# Patient Record
Sex: FEMALE | Race: WHITE | NOT HISPANIC OR LATINO | Employment: OTHER | URBAN - METROPOLITAN AREA
[De-identification: names, ages, dates, MRNs, and addresses within clinical notes are randomized per-mention and may not be internally consistent; named-entity substitution may affect disease eponyms.]

---

## 2023-08-28 PROBLEM — I10 ESSENTIAL (PRIMARY) HYPERTENSION: Status: ACTIVE | Noted: 2023-08-28

## 2023-08-28 PROBLEM — J34.89 NASAL OBSTRUCTION: Status: ACTIVE | Noted: 2023-08-28

## 2023-08-28 PROBLEM — J32.0 CHRONIC LEFT MAXILLARY SINUSITIS: Status: ACTIVE | Noted: 2023-08-28

## 2023-08-28 PROBLEM — R68.89 FULLNESS IN HEAD: Status: ACTIVE | Noted: 2023-08-28

## 2023-08-28 PROBLEM — G25.0 BENIGN ESSENTIAL TREMOR: Status: ACTIVE | Noted: 2023-08-28

## 2023-08-28 PROBLEM — I35.0 NONRHEUMATIC AORTIC (VALVE) STENOSIS: Status: ACTIVE | Noted: 2023-08-28

## 2023-08-28 PROBLEM — H69.93 EUSTACHIAN TUBE DYSFUNCTION, BILATERAL: Status: ACTIVE | Noted: 2023-08-28

## 2023-08-28 PROBLEM — R44.8 FACIAL PRESSURE: Status: ACTIVE | Noted: 2023-08-28

## 2023-08-28 PROBLEM — R42 VERTIGO: Status: ACTIVE | Noted: 2023-08-28

## 2023-08-28 PROBLEM — H81.09 MENIERE'S DISEASE: Status: ACTIVE | Noted: 2023-08-28

## 2023-08-28 PROBLEM — J32.4 CHRONIC PANSINUSITIS: Status: ACTIVE | Noted: 2023-08-28

## 2023-08-28 PROBLEM — G43.909 MIGRAINE HEADACHE: Status: ACTIVE | Noted: 2023-08-28

## 2023-08-28 PROBLEM — H93.13 TINNITUS OF BOTH EARS: Status: ACTIVE | Noted: 2023-08-28

## 2023-08-28 PROBLEM — Q23.0 CONGENITAL AORTIC STENOSIS (HHS-HCC): Status: ACTIVE | Noted: 2023-08-28

## 2023-08-28 PROBLEM — H81.09: Status: ACTIVE | Noted: 2023-08-28

## 2023-08-28 PROBLEM — L08.9 INFECTED SKIN LESION: Status: ACTIVE | Noted: 2023-08-28

## 2023-08-28 PROBLEM — H90.3 SENSORINEURAL HEARING LOSS (SNHL) OF BOTH EARS: Status: ACTIVE | Noted: 2023-08-28

## 2023-08-28 PROBLEM — G43.809 VESTIBULAR MIGRAINE: Status: ACTIVE | Noted: 2023-08-28

## 2023-08-28 PROBLEM — J32.8 OTHER CHRONIC SINUSITIS: Status: ACTIVE | Noted: 2023-08-28

## 2023-08-28 PROBLEM — J32.2 CHRONIC ETHMOIDAL SINUSITIS: Status: ACTIVE | Noted: 2023-08-28

## 2023-08-28 PROBLEM — R20.8 DYSESTHESIA: Status: ACTIVE | Noted: 2023-08-28

## 2023-08-28 RX ORDER — ASPIRIN 81 MG/1
1 TABLET ORAL DAILY
COMMUNITY
Start: 2022-02-10

## 2023-08-28 RX ORDER — ACETAMINOPHEN 500 MG
TABLET ORAL
COMMUNITY

## 2023-08-28 RX ORDER — BACLOFEN 20 MG
1 TABLET ORAL DAILY
COMMUNITY

## 2023-08-28 RX ORDER — LISINOPRIL 10 MG/1
1 TABLET ORAL DAILY
COMMUNITY
Start: 2016-03-22 | End: 2023-11-17 | Stop reason: SDUPTHER

## 2023-08-28 RX ORDER — TORSEMIDE 10 MG/1
1 TABLET ORAL DAILY
COMMUNITY
Start: 2016-03-23 | End: 2023-11-09 | Stop reason: SDUPTHER

## 2023-08-28 RX ORDER — TRIENTINE HYDROCHLORIDE 250 MG/1
CAPSULE ORAL
COMMUNITY
Start: 2022-07-14

## 2023-08-28 RX ORDER — FLUTICASONE PROPIONATE 50 MCG
1 SPRAY, SUSPENSION (ML) NASAL DAILY
COMMUNITY
Start: 2020-01-30

## 2023-08-28 RX ORDER — ASCORBIC ACID 125 MG
TABLET,CHEWABLE ORAL
COMMUNITY

## 2023-08-28 RX ORDER — PANTOPRAZOLE SODIUM 20 MG/1
TABLET, DELAYED RELEASE ORAL
COMMUNITY
Start: 2020-12-18

## 2023-08-28 RX ORDER — ASPIRIN 81 MG
TABLET, DELAYED RELEASE (ENTERIC COATED) ORAL
COMMUNITY

## 2023-08-28 RX ORDER — SERTRALINE HYDROCHLORIDE 25 MG/1
TABLET, FILM COATED ORAL
COMMUNITY
Start: 2023-03-15

## 2023-08-28 RX ORDER — ATORVASTATIN CALCIUM 10 MG/1
1 TABLET, FILM COATED ORAL DAILY
COMMUNITY
Start: 2022-03-04 | End: 2024-04-18 | Stop reason: SDUPTHER

## 2023-08-28 RX ORDER — PHYTONADIONE (VIT K1) 100 %
LIQUID (ML) MISCELLANEOUS
COMMUNITY

## 2023-08-28 RX ORDER — DIMENHYDRINATE 50 MG
1 TABLET ORAL EVERY 6 HOURS PRN
COMMUNITY

## 2023-08-28 RX ORDER — CALCIUM CARBONATE 300MG(750)
TABLET,CHEWABLE ORAL
COMMUNITY
Start: 2017-01-31

## 2023-08-28 RX ORDER — PANCRELIPASE 36000; 180000; 114000 [USP'U]/1; [USP'U]/1; [USP'U]/1
CAPSULE, DELAYED RELEASE PELLETS ORAL
COMMUNITY

## 2023-08-28 RX ORDER — EPINEPHRINE 0.22MG
AEROSOL WITH ADAPTER (ML) INHALATION
COMMUNITY
Start: 2017-01-31

## 2023-08-28 RX ORDER — WITCH HAZEL 50 %
1 PADS, MEDICATED (EA) TOPICAL
COMMUNITY

## 2023-08-28 RX ORDER — PSEUDOEPHEDRINE HYDROCHLORIDE 240 MG/1
TABLET, FILM COATED, EXTENDED RELEASE ORAL
COMMUNITY
Start: 2017-01-31 | End: 2024-04-18 | Stop reason: SDUPTHER

## 2023-08-28 RX ORDER — UBIQUINOL 100 %
POWDER (GRAM) MISCELLANEOUS
COMMUNITY

## 2023-08-28 RX ORDER — TRIAMCINOLONE ACETONIDE 55 UG/1
SPRAY, METERED NASAL
COMMUNITY
Start: 2019-02-27

## 2023-08-28 RX ORDER — LEVOTHYROXINE, LIOTHYRONINE 38; 9 UG/1; UG/1
TABLET ORAL
COMMUNITY
Start: 2016-03-03

## 2023-08-28 RX ORDER — PERPHENAZINE 16 MG
TABLET ORAL 2 TIMES DAILY
COMMUNITY
Start: 2017-01-31

## 2023-08-28 RX ORDER — NIACIN (INOSITOL NIACINATE) 400(500MG)
CAPSULE ORAL
COMMUNITY
Start: 2017-01-31

## 2023-10-02 ENCOUNTER — TELEPHONE (OUTPATIENT)
Dept: PRIMARY CARE | Facility: CLINIC | Age: 58
End: 2023-10-02
Payer: MEDICARE

## 2023-10-02 DIAGNOSIS — J32.9 SINUSITIS, UNSPECIFIED CHRONICITY, UNSPECIFIED LOCATION: Primary | ICD-10-CM

## 2023-10-02 RX ORDER — AMOXICILLIN AND CLAVULANATE POTASSIUM 875; 125 MG/1; MG/1
875 TABLET, FILM COATED ORAL 2 TIMES DAILY
Qty: 20 TABLET | Refills: 0 | Status: SHIPPED | OUTPATIENT
Start: 2023-10-02 | End: 2023-10-12

## 2023-10-05 ENCOUNTER — OFFICE VISIT (OUTPATIENT)
Dept: CARDIOLOGY | Facility: CLINIC | Age: 58
End: 2023-10-05
Payer: MEDICARE

## 2023-10-05 ENCOUNTER — HOSPITAL ENCOUNTER (OUTPATIENT)
Dept: CARDIOLOGY | Facility: CLINIC | Age: 58
Discharge: HOME | End: 2023-10-05
Payer: MEDICARE

## 2023-10-05 VITALS
HEART RATE: 101 BPM | SYSTOLIC BLOOD PRESSURE: 146 MMHG | WEIGHT: 108.2 LBS | BODY MASS INDEX: 21.81 KG/M2 | HEIGHT: 59 IN | DIASTOLIC BLOOD PRESSURE: 80 MMHG

## 2023-10-05 DIAGNOSIS — Q23.1 CONGENITAL INSUFFICIENCY OF AORTIC VALVE (HHS-HCC): ICD-10-CM

## 2023-10-05 DIAGNOSIS — I10 ESSENTIAL (PRIMARY) HYPERTENSION: ICD-10-CM

## 2023-10-05 DIAGNOSIS — I35.0 AORTIC VALVE STENOSIS, ETIOLOGY OF CARDIAC VALVE DISEASE UNSPECIFIED: ICD-10-CM

## 2023-10-05 DIAGNOSIS — E78.2 MIXED HYPERLIPIDEMIA: ICD-10-CM

## 2023-10-05 DIAGNOSIS — I35.0 NONRHEUMATIC AORTIC (VALVE) STENOSIS: ICD-10-CM

## 2023-10-05 DIAGNOSIS — Q23.0 CONGENITAL AORTIC STENOSIS (HHS-HCC): Primary | ICD-10-CM

## 2023-10-05 PROCEDURE — 99214 OFFICE O/P EST MOD 30 MIN: CPT | Performed by: INTERNAL MEDICINE

## 2023-10-05 PROCEDURE — 3077F SYST BP >= 140 MM HG: CPT | Performed by: INTERNAL MEDICINE

## 2023-10-05 PROCEDURE — 93306 TTE W/DOPPLER COMPLETE: CPT | Performed by: INTERNAL MEDICINE

## 2023-10-05 PROCEDURE — 93306 TTE W/DOPPLER COMPLETE: CPT

## 2023-10-05 PROCEDURE — 3079F DIAST BP 80-89 MM HG: CPT | Performed by: INTERNAL MEDICINE

## 2023-11-09 DIAGNOSIS — I10 ESSENTIAL (PRIMARY) HYPERTENSION: Primary | ICD-10-CM

## 2023-11-09 DIAGNOSIS — G43.809 VESTIBULAR MIGRAINE: ICD-10-CM

## 2023-11-09 RX ORDER — TORSEMIDE 10 MG/1
10 TABLET ORAL DAILY
Qty: 30 TABLET | Refills: 0 | Status: SHIPPED | OUTPATIENT
Start: 2023-11-09 | End: 2023-11-17 | Stop reason: SDUPTHER

## 2023-11-13 LAB
BODY SURFACE AREA: 1.43 M2
EJECTION FRACTION APICAL 4 CHAMBER: 74.8

## 2023-11-16 DIAGNOSIS — G43.809 VESTIBULAR MIGRAINE: ICD-10-CM

## 2023-11-16 DIAGNOSIS — I10 ESSENTIAL (PRIMARY) HYPERTENSION: Primary | ICD-10-CM

## 2023-11-16 RX ORDER — LISINOPRIL 10 MG/1
10 TABLET ORAL DAILY
Status: CANCELLED | OUTPATIENT
Start: 2023-11-16

## 2023-11-16 NOTE — TELEPHONE ENCOUNTER
Patient is calling in wanting to clarify that her     Torsemide 10 mg is taken twice daily not one time daily so the next one to be called in needs to be changed to twice daily.  Patient states that she is due for this refill on Monday if it can be sent it and she will  when due.     Patient is needing Lisinopril 10 mg     Wants them sent to [place] in chart.

## 2023-11-17 RX ORDER — LISINOPRIL 10 MG/1
10 TABLET ORAL DAILY
Qty: 30 TABLET | Refills: 11 | Status: SHIPPED | OUTPATIENT
Start: 2023-11-17 | End: 2024-03-25 | Stop reason: SDUPTHER

## 2023-11-17 RX ORDER — TORSEMIDE 10 MG/1
10 TABLET ORAL 2 TIMES DAILY
Qty: 60 TABLET | Refills: 0 | Status: SHIPPED | OUTPATIENT
Start: 2023-11-17 | End: 2023-12-19

## 2023-12-22 DIAGNOSIS — G25.0 BENIGN ESSENTIAL TREMOR: ICD-10-CM

## 2023-12-22 DIAGNOSIS — G43.809 VESTIBULAR MIGRAINE: ICD-10-CM

## 2023-12-22 DIAGNOSIS — Z00.00 ROUTINE GENERAL MEDICAL EXAMINATION AT A HEALTH CARE FACILITY: ICD-10-CM

## 2023-12-22 DIAGNOSIS — I10 ESSENTIAL (PRIMARY) HYPERTENSION: Primary | ICD-10-CM

## 2023-12-22 RX ORDER — TORSEMIDE 10 MG/1
TABLET ORAL
Qty: 60 TABLET | Refills: 0 | Status: SHIPPED | OUTPATIENT
Start: 2023-12-22 | End: 2024-02-16

## 2023-12-23 NOTE — PROGRESS NOTES
Subjective   Patient ID: Rosita Darby is a 58 y.o. female who presents for No chief complaint on file..  HPI    Review of Systems    Objective   Physical Exam    Assessment/Plan              .VS

## 2024-02-16 DIAGNOSIS — G43.809 VESTIBULAR MIGRAINE: ICD-10-CM

## 2024-02-16 DIAGNOSIS — I10 ESSENTIAL (PRIMARY) HYPERTENSION: ICD-10-CM

## 2024-02-16 RX ORDER — TORSEMIDE 10 MG/1
TABLET ORAL
Qty: 60 TABLET | Refills: 0 | Status: SHIPPED | OUTPATIENT
Start: 2024-02-16 | End: 2024-03-14

## 2024-03-14 DIAGNOSIS — G43.809 VESTIBULAR MIGRAINE: ICD-10-CM

## 2024-03-14 DIAGNOSIS — I10 ESSENTIAL (PRIMARY) HYPERTENSION: ICD-10-CM

## 2024-03-14 RX ORDER — TORSEMIDE 10 MG/1
TABLET ORAL
Qty: 60 TABLET | Refills: 0 | Status: SHIPPED | OUTPATIENT
Start: 2024-03-14 | End: 2024-03-25

## 2024-03-24 DIAGNOSIS — I10 ESSENTIAL (PRIMARY) HYPERTENSION: ICD-10-CM

## 2024-03-24 DIAGNOSIS — G43.809 VESTIBULAR MIGRAINE: ICD-10-CM

## 2024-03-25 DIAGNOSIS — I10 ESSENTIAL (PRIMARY) HYPERTENSION: ICD-10-CM

## 2024-03-25 RX ORDER — TORSEMIDE 10 MG/1
TABLET ORAL
Qty: 60 TABLET | Refills: 0 | Status: SHIPPED | OUTPATIENT
Start: 2024-03-25 | End: 2024-04-18 | Stop reason: SDUPTHER

## 2024-03-25 RX ORDER — LISINOPRIL 10 MG/1
10 TABLET ORAL DAILY
Qty: 30 TABLET | Refills: 11 | Status: SHIPPED | OUTPATIENT
Start: 2024-03-25 | End: 2024-04-18 | Stop reason: SDUPTHER

## 2024-04-18 ENCOUNTER — OFFICE VISIT (OUTPATIENT)
Dept: PRIMARY CARE | Facility: CLINIC | Age: 59
End: 2024-04-18
Payer: MEDICARE

## 2024-04-18 VITALS
HEART RATE: 100 BPM | HEIGHT: 58 IN | DIASTOLIC BLOOD PRESSURE: 70 MMHG | TEMPERATURE: 98.7 F | WEIGHT: 115 LBS | SYSTOLIC BLOOD PRESSURE: 102 MMHG | BODY MASS INDEX: 24.14 KG/M2

## 2024-04-18 DIAGNOSIS — Z78.9 FULL CODE STATUS: ICD-10-CM

## 2024-04-18 DIAGNOSIS — Z12.11 COLON CANCER SCREENING: ICD-10-CM

## 2024-04-18 DIAGNOSIS — E03.9 HYPOTHYROIDISM, UNSPECIFIED TYPE: ICD-10-CM

## 2024-04-18 DIAGNOSIS — Z00.01 ENCOUNTER FOR GENERAL ADULT MEDICAL EXAMINATION WITH ABNORMAL FINDINGS: Primary | ICD-10-CM

## 2024-04-18 DIAGNOSIS — Z12.31 ENCOUNTER FOR SCREENING MAMMOGRAM FOR MALIGNANT NEOPLASM OF BREAST: ICD-10-CM

## 2024-04-18 DIAGNOSIS — E78.5 HYPERLIPIDEMIA, UNSPECIFIED HYPERLIPIDEMIA TYPE: ICD-10-CM

## 2024-04-18 DIAGNOSIS — Z11.4 ENCOUNTER FOR SCREENING FOR HIV: ICD-10-CM

## 2024-04-18 DIAGNOSIS — H81.09 MENIERE'S DISEASE, UNSPECIFIED LATERALITY: ICD-10-CM

## 2024-04-18 DIAGNOSIS — M81.0 AGE-RELATED OSTEOPOROSIS WITHOUT CURRENT PATHOLOGICAL FRACTURE: ICD-10-CM

## 2024-04-18 DIAGNOSIS — Z00.00 ANNUAL PHYSICAL EXAM: ICD-10-CM

## 2024-04-18 DIAGNOSIS — G43.809 VESTIBULAR MIGRAINE: ICD-10-CM

## 2024-04-18 DIAGNOSIS — Z11.59 ENCOUNTER FOR HEPATITIS C SCREENING TEST FOR LOW RISK PATIENT: ICD-10-CM

## 2024-04-18 DIAGNOSIS — I10 ESSENTIAL (PRIMARY) HYPERTENSION: ICD-10-CM

## 2024-04-18 PROCEDURE — G0439 PPPS, SUBSEQ VISIT: HCPCS | Performed by: FAMILY MEDICINE

## 2024-04-18 PROCEDURE — 99214 OFFICE O/P EST MOD 30 MIN: CPT | Performed by: FAMILY MEDICINE

## 2024-04-18 PROCEDURE — 1036F TOBACCO NON-USER: CPT | Performed by: FAMILY MEDICINE

## 2024-04-18 PROCEDURE — 3078F DIAST BP <80 MM HG: CPT | Performed by: FAMILY MEDICINE

## 2024-04-18 PROCEDURE — 3074F SYST BP LT 130 MM HG: CPT | Performed by: FAMILY MEDICINE

## 2024-04-18 PROCEDURE — G2211 COMPLEX E/M VISIT ADD ON: HCPCS | Performed by: FAMILY MEDICINE

## 2024-04-18 RX ORDER — LISINOPRIL 10 MG/1
10 TABLET ORAL DAILY
Qty: 90 TABLET | Refills: 3 | Status: SHIPPED | OUTPATIENT
Start: 2024-04-18 | End: 2025-04-18

## 2024-04-18 RX ORDER — PSEUDOEPHEDRINE HYDROCHLORIDE 240 MG/1
240 TABLET, FILM COATED, EXTENDED RELEASE ORAL DAILY
Qty: 14 TABLET | Refills: 3 | Status: SHIPPED | OUTPATIENT
Start: 2024-04-18 | End: 2024-05-01 | Stop reason: SDUPTHER

## 2024-04-18 RX ORDER — ATORVASTATIN CALCIUM 10 MG/1
10 TABLET, FILM COATED ORAL DAILY
Qty: 90 TABLET | Refills: 3 | Status: SHIPPED | OUTPATIENT
Start: 2024-04-18 | End: 2025-04-18

## 2024-04-18 RX ORDER — TORSEMIDE 10 MG/1
TABLET ORAL
Qty: 60 TABLET | Refills: 2 | Status: SHIPPED | OUTPATIENT
Start: 2024-04-18

## 2024-04-18 ASSESSMENT — ACTIVITIES OF DAILY LIVING (ADL)
DOING_HOUSEWORK: INDEPENDENT
GROCERY_SHOPPING: INDEPENDENT
MANAGING_FINANCES: INDEPENDENT
TAKING_MEDICATION: INDEPENDENT
BATHING: INDEPENDENT
DRESSING: INDEPENDENT

## 2024-04-18 ASSESSMENT — ENCOUNTER SYMPTOMS
LOSS OF SENSATION IN FEET: 0
DEPRESSION: 0
OCCASIONAL FEELINGS OF UNSTEADINESS: 0

## 2024-04-18 ASSESSMENT — PATIENT HEALTH QUESTIONNAIRE - PHQ9
1. LITTLE INTEREST OR PLEASURE IN DOING THINGS: NOT AT ALL
2. FEELING DOWN, DEPRESSED OR HOPELESS: NOT AT ALL
SUM OF ALL RESPONSES TO PHQ9 QUESTIONS 1 AND 2: 0

## 2024-04-18 NOTE — PATIENT INSTRUCTIONS
KNOWN TO DR HUSAIN FOR ENDOCRINE THYROID CARE   NOW REFER TO ENDOCRINOLOGY FOR OSTEOPOROSIS NEEDED.   KEEP CARE WITH DR BULL OF RHEUMATOLOGY FOR THINNING BONES.    USE [facility].   CALL FOR NEEDS [phone].    GET FASTED LABS COMPLETED.    SORRY WE CANNOT JUSTIFY TESTING YOUR BLOOD TYPE.    GET MAMMO DONE.   GET COLON CANCER SCREENING DONE.

## 2024-04-18 NOTE — PROGRESS NOTES
"Subjective   Patient ID: Rosita Darby is a 59 y.o. female who presents for Medicare Annual Wellness Visit Initial (MEDICARE WELLNESS EXAM ).  HPI  AWV  0820  08:45 = 25 MINUTES FOR 39881   09:02 = 17 MINUTES   DURATION:  42 MINUTES     PT ASKS FOR AN INSULIN LEVEL,  WHY?   DOSE LOSARTAN INCREASE BLOOD SUGAR?  MY BROTHER IS ON IT.   [LIKELY BROTHER IS ON SGLP2 FOR HEART OR KIDNEYS NOT DM2].      PT ASKS FOR HER HT. 4' 9.75\"   HT WAS TALLER AT RHEUMATOLOGY 4\"10\"    JUNE 2023 OSTEOPOROSIS ON DEXA.      ? ENDO FOR THYROID  ? ENDO FOR OSTEOPOROSIS NEEDED  RHEUM DR BULL     Review of Systems    Objective   Physical Exam    Assessment/Plan   Diagnoses and all orders for this visit:  Encounter for general adult medical examination with abnormal findings  Age-related osteoporosis without current pathological fracture  -     Referral to Endocrinology; Future  -     Lipid Panel; Future  -     Vitamin D 25-Hydroxy,Total (for eval of Vitamin D levels); Future  -     Urinalysis with Reflex Microscopic; Future  -     TSH with reflex to Free T4 if abnormal; Future  -     Comprehensive Metabolic Panel; Future  -     CBC and Auto Differential; Future  -     Homocysteine, serum; Future  -     Referral to Rheumatology; Future  Hypothyroidism, unspecified type  -     Referral to Endocrinology; Future  -     Lipid Panel; Future  -     Vitamin D 25-Hydroxy,Total (for eval of Vitamin D levels); Future  -     Urinalysis with Reflex Microscopic; Future  -     TSH with reflex to Free T4 if abnormal; Future  -     Comprehensive Metabolic Panel; Future  -     CBC and Auto Differential; Future  -     Homocysteine, serum; Future  Encounter for hepatitis C screening test for low risk patient  -     Hepatitis C antibody; Future  Encounter for screening for HIV  -     HIV 1/2 Antigen/Antibody Screen with Reflex to Confirmation; Future  Annual physical exam  -     Lipid Panel; Future  Essential (primary) hypertension  -     Lipid Panel; " Future  -     Vitamin D 25-Hydroxy,Total (for eval of Vitamin D levels); Future  -     Urinalysis with Reflex Microscopic; Future  -     TSH with reflex to Free T4 if abnormal; Future  -     Comprehensive Metabolic Panel; Future  -     CBC and Auto Differential; Future  -     Homocysteine, serum; Future  -     Sedimentation Rate; Future  Meniere's disease, unspecified laterality  -     Homocysteine, serum; Future  -     Sedimentation Rate; Future  Colon cancer screening  -     Cologuard® colon cancer screening; Future  Encounter for screening mammogram for malignant neoplasm of breast  -     BI mammo bilateral screening tomosynthesis; Future             FROM MEDENT:  Date: 23   HISTORY SUMMARY - [facility] INTERNAL MEDICINE SPEC       Name:  Rosita Darby                           Acct#: [id number]    Sex: F  : 1965  Age: 58 years            PMH:  Immun/Inj. Record:  22397-To Vaccine 7 Yrs &> Decavac  NDC [address]-88 42106-939-05 sanof   90659-Influenza Virus Whole   90657-Fluzone Split 6-35 Mon  NDC [address]-45   Problem List: Inactive Meniere's disease, Sensorineural hearing loss, bilateral, Dysfunction of eustachian tube, Nasal congestion, Migraine, Essential tremor, Meniere's disease, Vertigo, Bilateral tinnitus, Symptom of head, Skin lesion, Migraine variants, Chronic pansinusitis, Acute sinusitis, Crusting on nose, Congenital stenosis of aortic valve, Abnormal sensation, Nasal obstruction, Chronic sinusitis, Chronic ethmoidal sinusitis, Chronic maxillary sinusitis  Health Maintenance:  Bone Density Test - (2016) T-Score: -2.6/-2.7/-2.7 INCREASED RISK:  2.2. OSTEOPOROSIS   Mammogram - (10/19/2015) WNL  Ophtho Care - (2017)  echo - (10/3/2022) ECHO 10/'22: BICUSPID AND STENOTIC AORTIC VALVE; LVEF 65% ; MILD AS - NL 89gnt27:pgs  EGD - (2019) NL EGD:  5.16.19:pgs  DR PRIETO HAS BEEN NEG.  EGD HH 3 CM NOTED.    Stress ECG - (2019) NL STRESS ECG -  19:pgs  Carotid U/S - (2020) : DIFFUSE PLAQUE;  50-59% RT; NL LEFT - 2020:PGS    Mammogram - (2020) ABNL MAMMO LT UPPER OUTER QUADRANT: #2 ASSYM STRX: 2:30 & 3:30 O'CLOCK:  SURG DR ERICKSON   Flu - CANNOT TAKE ALLERGIC TO IT   Bone Density Test - (2021) osteoporosis:         Covid Vax - (2022) REFUSED   Carotid U/S - (5/3/2023) :     proximal rt common carotid artery stenosis - stable as are other findings:  ok.    Bone Density Test - (6/15/2023) : worse osteoporosis- refer to endo dr porras       osteoporosis - (2023) : RHEUM TO TEST PT/CONSIDER TX; EVENITY, TYMLOS/FORTEO:       MAMMOS Q YEARLY  Medical Problems:  Attention Deficit Hyperactivity Disorder, Seasonal Allergies, Arrhythmia, Gallstones, High Blood Pressure, Osteoporosis, Rheumatoid Arthritis, Thyroid Disease  Jra - KNEES--HX OF JRA DR BECK IN PAST  Surgical Hx:  total hysterectomy not due to malignancy - BLEEDING  Tonsillectomy, Adenoidectomy  Excision Of Breast Mass - (2020) LEFT BREAST MASS/CALCIFIED CORE BX FR ERICKSON 2020:PGS    FH:  HTN; TROUBLE SLEEPING; MGM AGE 53  OF STROKE; BROTHER HEART DZ; PGF LUNG CANCER. FHX: BRO ILL HTN UNCONTROLLED; DAD ? AFIB TIA; PGRMOM ILL TOO... B'DAY AUG 4789=660...  .  Father:Age: 77 as of 2017.  Mother: due to Heart Disease.    SH:  Marital: .Lives With: Son.Occupation: [employer] - (2021) RETIRED/DISABLED.  Personal Habits:  Cigarette Use: Never Smoked Cigarettes.Alcohol: Occasional Alcoholic Beverage.Daily Caffeine: Consumes on average 2 cups of tea per day, Consumes on average 2 cups of coffee per day.Enjoy Exercising: Enjoys exercising.

## 2024-04-19 ENCOUNTER — TELEPHONE (OUTPATIENT)
Dept: PRIMARY CARE | Facility: CLINIC | Age: 59
End: 2024-04-19
Payer: MEDICARE

## 2024-05-01 DIAGNOSIS — E78.5 HYPERLIPIDEMIA, UNSPECIFIED HYPERLIPIDEMIA TYPE: Primary | ICD-10-CM

## 2024-05-01 DIAGNOSIS — G43.809 VESTIBULAR MIGRAINE: ICD-10-CM

## 2024-05-01 DIAGNOSIS — I10 ESSENTIAL (PRIMARY) HYPERTENSION: ICD-10-CM

## 2024-05-01 DIAGNOSIS — H81.09 MENIERE'S DISEASE, UNSPECIFIED LATERALITY: ICD-10-CM

## 2024-05-01 RX ORDER — PSEUDOEPHEDRINE HYDROCHLORIDE 240 MG/1
240 TABLET, FILM COATED, EXTENDED RELEASE ORAL DAILY
Qty: 14 TABLET | Refills: 3 | Status: SHIPPED | OUTPATIENT
Start: 2024-05-01 | End: 2024-05-03 | Stop reason: SDUPTHER

## 2024-05-01 RX ORDER — PSEUDOEPHEDRINE HYDROCHLORIDE 240 MG/1
240 TABLET, FILM COATED, EXTENDED RELEASE ORAL DAILY
Qty: 30 TABLET | Refills: 11 | Status: SHIPPED | OUTPATIENT
Start: 2024-05-01 | End: 2024-05-03 | Stop reason: SDUPTHER

## 2024-05-01 NOTE — TELEPHONE ENCOUNTER
Patient called states that she gets the sudafed for 30 day at a time and needs to be sent to  in chart. Also is asking for her LPA ordered on blood work.

## 2024-05-03 ENCOUNTER — TELEPHONE (OUTPATIENT)
Dept: PRIMARY CARE | Facility: CLINIC | Age: 59
End: 2024-05-03
Payer: MEDICARE

## 2024-05-03 DIAGNOSIS — I10 ESSENTIAL (PRIMARY) HYPERTENSION: ICD-10-CM

## 2024-05-03 DIAGNOSIS — H81.09 MENIERE'S DISEASE, UNSPECIFIED LATERALITY: ICD-10-CM

## 2024-05-03 DIAGNOSIS — G43.809 VESTIBULAR MIGRAINE: ICD-10-CM

## 2024-05-03 RX ORDER — PSEUDOEPHEDRINE HYDROCHLORIDE 240 MG/1
240 TABLET, FILM COATED, EXTENDED RELEASE ORAL DAILY
Qty: 14 TABLET | Refills: 3 | Status: SHIPPED | OUTPATIENT
Start: 2024-05-03

## 2024-07-20 DIAGNOSIS — G43.809 VESTIBULAR MIGRAINE: ICD-10-CM

## 2024-07-20 DIAGNOSIS — I10 ESSENTIAL (PRIMARY) HYPERTENSION: ICD-10-CM

## 2024-07-22 RX ORDER — TORSEMIDE 10 MG/1
TABLET ORAL
Qty: 60 TABLET | Refills: 0 | Status: SHIPPED | OUTPATIENT
Start: 2024-07-22

## 2024-08-23 DIAGNOSIS — I10 ESSENTIAL (PRIMARY) HYPERTENSION: ICD-10-CM

## 2024-08-23 DIAGNOSIS — G43.809 VESTIBULAR MIGRAINE: ICD-10-CM

## 2024-08-26 RX ORDER — TORSEMIDE 10 MG/1
TABLET ORAL
Qty: 60 TABLET | Refills: 0 | Status: SHIPPED | OUTPATIENT
Start: 2024-08-26

## 2024-09-20 DIAGNOSIS — I10 ESSENTIAL (PRIMARY) HYPERTENSION: ICD-10-CM

## 2024-09-20 DIAGNOSIS — G43.809 VESTIBULAR MIGRAINE: ICD-10-CM

## 2024-09-20 RX ORDER — TORSEMIDE 10 MG/1
TABLET ORAL
Qty: 60 TABLET | Refills: 0 | Status: SHIPPED | OUTPATIENT
Start: 2024-09-20

## 2024-10-10 ENCOUNTER — APPOINTMENT (OUTPATIENT)
Dept: CARDIOLOGY | Facility: CLINIC | Age: 59
End: 2024-10-10
Payer: MEDICARE

## 2024-10-14 ENCOUNTER — OFFICE VISIT (OUTPATIENT)
Dept: CARDIOLOGY | Facility: CLINIC | Age: 59
End: 2024-10-14
Payer: MEDICARE

## 2024-10-14 ENCOUNTER — HOSPITAL ENCOUNTER (OUTPATIENT)
Dept: CARDIOLOGY | Facility: CLINIC | Age: 59
Discharge: HOME | End: 2024-10-14
Payer: MEDICARE

## 2024-10-14 VITALS
DIASTOLIC BLOOD PRESSURE: 83 MMHG | HEART RATE: 101 BPM | WEIGHT: 116.2 LBS | TEMPERATURE: 96.2 F | SYSTOLIC BLOOD PRESSURE: 168 MMHG | BODY MASS INDEX: 24.39 KG/M2 | HEIGHT: 58 IN

## 2024-10-14 DIAGNOSIS — I35.0 NONRHEUMATIC AORTIC (VALVE) STENOSIS: ICD-10-CM

## 2024-10-14 DIAGNOSIS — Q23.0 CONGENITAL AORTIC STENOSIS (HHS-HCC): ICD-10-CM

## 2024-10-14 DIAGNOSIS — E78.2 MIXED HYPERLIPIDEMIA: ICD-10-CM

## 2024-10-14 DIAGNOSIS — I10 ESSENTIAL (PRIMARY) HYPERTENSION: ICD-10-CM

## 2024-10-14 DIAGNOSIS — Q23.1 CONGENITAL INSUFFICIENCY OF AORTIC VALVE (HHS-HCC): ICD-10-CM

## 2024-10-14 LAB
AORTIC VALVE MEAN GRADIENT: 29.3 MMHG
AORTIC VALVE PEAK VELOCITY: 3.65 M/S
AV PEAK GRADIENT: 53.4 MMHG
AVA (PEAK VEL): 0.73 CM2
AVA (VTI): 0.82 CM2
EJECTION FRACTION APICAL 4 CHAMBER: 70.4
EJECTION FRACTION: 67 %
LEFT ATRIUM VOLUME AREA LENGTH INDEX BSA: 25.9 ML/M2
LEFT VENTRICLE INTERNAL DIMENSION DIASTOLE: 3.01 CM (ref 3.5–6)
LEFT VENTRICULAR OUTFLOW TRACT DIAMETER: 1.67 CM
MITRAL VALVE E/A RATIO: 0.69
RIGHT VENTRICLE FREE WALL PEAK S': 14 CM/S
RIGHT VENTRICLE PEAK SYSTOLIC PRESSURE: 26.5 MMHG
TRICUSPID ANNULAR PLANE SYSTOLIC EXCURSION: 2.2 CM

## 2024-10-14 PROCEDURE — 3077F SYST BP >= 140 MM HG: CPT | Performed by: INTERNAL MEDICINE

## 2024-10-14 PROCEDURE — 93306 TTE W/DOPPLER COMPLETE: CPT

## 2024-10-14 PROCEDURE — 93306 TTE W/DOPPLER COMPLETE: CPT | Performed by: INTERNAL MEDICINE

## 2024-10-14 PROCEDURE — 99214 OFFICE O/P EST MOD 30 MIN: CPT | Performed by: INTERNAL MEDICINE

## 2024-10-14 PROCEDURE — 3008F BODY MASS INDEX DOCD: CPT | Performed by: INTERNAL MEDICINE

## 2024-10-14 PROCEDURE — 1036F TOBACCO NON-USER: CPT | Performed by: INTERNAL MEDICINE

## 2024-10-14 PROCEDURE — 3079F DIAST BP 80-89 MM HG: CPT | Performed by: INTERNAL MEDICINE

## 2024-10-14 NOTE — PROGRESS NOTES
Chief Complaint:   Valve Disorder     History of Present Illness     Rosita Darby is a 59 y.o. female presenting with moderate aortic stenosis.  The patient has been well since their last appointment and has no cardiac complaints.  Exercising. The patient denies chest pain, shortness of breath, or syncope.      Review of Systems  All pertinent systems have been reviewed and are negative except for what is stated in the history of present illness.    All other systems have been reviewed and are negative and noncontributory to this patient's current ailments.  .       Previous History     Past Medical History:  She has a past medical history of Allergy to other foods, Aortic stenosis due to bicuspid aortic valve, Meniere's disease, unspecified ear, Other allergy status, other than to drugs and biological substances, Other conditions influencing health status, Personal history of other diseases of the circulatory system, Personal history of other diseases of the circulatory system, and Personal history of other diseases of the musculoskeletal system and connective tissue.    Past Surgical History:  She has a past surgical history that includes Tonsillectomy (01/31/2017); Hysterectomy (01/31/2017); Laparoscopy diagnostic / biopsy / aspiration / lysis (01/31/2017); Dilation and curettage of uterus (01/31/2017); and BI stereotactic guided breast left localization (Left, 11/24/2020).      Social History:  She reports that she has never smoked. She has never used smokeless tobacco. She reports that she does not currently use alcohol. She reports that she does not use drugs.    Family History:  Family History   Problem Relation Name Age of Onset    Rheumatic fever Mother      Other (heart trouble) Brother      Other (heart trouble) Maternal Grandmother      Other (heart trouble) Paternal Grandmother      Other (heart trouble) Paternal Grandfather          Allergies:  Egg, Influenza virus vaccine whole, Latex, Lidocaine hcl,  Lorazepam, Milk, Sulfa (sulfonamide antibiotics), and Tree nuts    Outpatient Medications:  Current Outpatient Medications   Medication Instructions    ACETYLCYSTEINE ORAL oral    adrenal cortex, porcine, (ADRENAL ORAL) oral, organ concentrates (ADRENAL ORAL)    alpha lipoic acid 600 mg capsule oral, 2 times daily    aspirin 81 mg EC tablet 1 tablet, oral, Daily    Bacillus subtilis-inulin 1.5 billion cell-1 gram tablet,chewable oral, Daily, 1    BERBERINE CHLORIDE ORAL oral    BORON ORAL oral    cholecalciferol (Vitamin D-3) 5,000 Units tablet oral, Once Weekly, Take 1     coenzyme Q-10 100 mg capsule oral    coenzyme Q10-vitamin E 100-5 mg-unit capsule     Creon 36,000-114,000- 180,000 unit capsule,delayed release(DR/EC) capsule oral, take 1 capsule by oral route 2 times every day with meals and 1 capsule with each snack swallowing whole. Do not crush, chew and/or divide.    cyanocobalamin (Vitamin B-12) 2,000 mcg tablet 1 tablet, oral    dimenhyDRINATE (Dramamine) 50 mg tablet 1 tablet, oral, Every 6 hours PRN    fluticasone (Flonase) 50 mcg/actuation nasal spray 1 spray, Each Nostril, Daily    magnesium oxide 500 mg tablet 1 tablet, oral, Daily    NP Thyroid 60 mg tablet oral, 2 times daily, Take 1     OREGANO OIL ORAL oral    pantoprazole (ProtoNix) 20 mg EC tablet oral    phytonadione, vit K1,, bulk, (Vitamin K1) 100 % liquid VITAMIN K (unknown strength)    SELENIUM ORAL oral, SELENIUM ORAL    sertraline (Zoloft) 25 mg tablet     Sudafed 24 Hour 240 mg, oral, Daily, SUDAFED IS THE ONLY DRUG PT HAS GO TO [facility].    torsemide (Demadex) 10 mg tablet TAKE ONE TABLET BY MOUTH TWICE A DAY. FORCE FLUIDS AND LIMIT SALT INTAKE AS DIRECTED    triamcinolone (Nasacort) 55 mcg nasal inhaler     trientine (Syprine) 250 mg capsule oral    ubiquinol, bulk, 100 % powder UBIQUINOL (unknown strength)    vitamin K2 100 mcg capsule oral, VITAMIN K2 (unknown strength)    ZINC ORAL oral, Daily, ZINC (unknown strength); take 1  "      Physical Examination   Vitals:  Visit Vitals  /83   Pulse 101   Temp 35.7 °C (96.2 °F)   Ht 1.473 m (4' 10\")   Wt 52.7 kg (116 lb 3.2 oz)   BMI 24.29 kg/m²   Smoking Status Never   BSA 1.47 m²    Physical Exam  Vitals reviewed.   Constitutional:       General: She is not in acute distress.     Appearance: Normal appearance.   HENT:      Head: Normocephalic and atraumatic.      Nose: Nose normal.   Eyes:      Conjunctiva/sclera: Conjunctivae normal.   Cardiovascular:      Rate and Rhythm: Normal rate and regular rhythm.      Pulses: Normal pulses.      Heart sounds: Murmur heard.      Systolic murmur is present with a grade of 3/6.   Pulmonary:      Effort: Pulmonary effort is normal. No respiratory distress.      Breath sounds: Normal breath sounds. No wheezing, rhonchi or rales.   Abdominal:      General: Bowel sounds are normal. There is no distension.      Palpations: Abdomen is soft.      Tenderness: There is no abdominal tenderness.   Musculoskeletal:         General: No swelling.      Right lower leg: No edema.      Left lower leg: No edema.   Skin:     General: Skin is warm and dry.      Capillary Refill: Capillary refill takes less than 2 seconds.   Neurological:      General: No focal deficit present.      Mental Status: She is alert.   Psychiatric:         Mood and Affect: Mood normal.             Labs/Imaging/Cardiac Studies     Last Labs:  CBC -  No results found for: \"WBC\", \"HGB\", \"HCT\", \"MCV\", \"PLT\"    CMP -  No results found for: \"CALCIUM\", \"PHOS\", \"PROT\", \"ALBUMIN\", \"AST\", \"ALT\", \"ALKPHOS\", \"BILITOT\"    LIPID PANEL -   No results found for: \"CHOL\", \"HDL\", \"CHHDL\", \"LDL\", \"VLDL\", \"TRIG\", \"NHDL\"    RENAL FUNCTION PANEL -   No results found for: \"GLU\", \"K\", \"CHLOR\", \"PHOS\"    No results found for: \"BNP\", \"HGBA1C\"    ECG:    Echo:  No echocardiogram results found for the past 12 months       Assessment and Recommendations     Assessment/Plan       1. Congenital aortic stenosis ([facility])  The " patient has moderate aortic stenosis and remains asymptomatic.  There is no indication for AVR.  The patient will continue to have serial annual echocardiography and was counseled on the expected symptoms related to aortic stenosis.  Weight lifting restrictions reviewed.    - Follow Up In Cardiology    2. Essential (primary) hypertension  She states her allergist told her she does not need ACE and that her BP has been normal. Advised to follow.  - Follow Up In Cardiology     Rosita Darby will return in 1 year for an office visit with Echo.         Bill Daly MD    Exclusive of any other services or procedures performed, I, Bill Daly MD , spent 30 minutes in duration for this visit today.  This time consisted of chart review, obtaining history, and/or performing the exam as documented above as well as documenting the clinical information for the encounter in the electronic record, discussing treatment options, plans, and/or goals with patient, family, and/or caregiver, refilling medications, updating the electronic record, ordering medicines, lab work, imaging, referrals, and/or procedures as documented above and communicating with other [facility]. I have discussed the results of laboratory, radiology, and cardiology studies with the patient and their family/caregiver.

## 2024-10-15 ENCOUNTER — TELEPHONE (OUTPATIENT)
Dept: CARDIOLOGY | Facility: CLINIC | Age: 59
End: 2024-10-15
Payer: MEDICARE

## 2024-10-15 DIAGNOSIS — I10 ESSENTIAL (PRIMARY) HYPERTENSION: ICD-10-CM

## 2024-10-15 DIAGNOSIS — G43.809 VESTIBULAR MIGRAINE: ICD-10-CM

## 2024-10-15 RX ORDER — TORSEMIDE 10 MG/1
TABLET ORAL
Qty: 60 TABLET | Refills: 0 | Status: SHIPPED | OUTPATIENT
Start: 2024-10-15

## 2024-10-31 ENCOUNTER — TELEMEDICINE (OUTPATIENT)
Dept: PRIMARY CARE | Facility: CLINIC | Age: 59
End: 2024-10-31
Payer: MEDICARE

## 2024-10-31 DIAGNOSIS — J32.9 SINUSITIS, UNSPECIFIED CHRONICITY, UNSPECIFIED LOCATION: Primary | ICD-10-CM

## 2024-10-31 PROCEDURE — 99442 PR PHYS/QHP TELEPHONE EVALUATION 11-20 MIN: CPT | Performed by: FAMILY MEDICINE

## 2024-10-31 RX ORDER — AMOXICILLIN AND CLAVULANATE POTASSIUM 875; 125 MG/1; MG/1
875 TABLET, FILM COATED ORAL 2 TIMES DAILY
Qty: 28 TABLET | Refills: 0 | Status: SHIPPED | OUTPATIENT
Start: 2024-10-31 | End: 2024-11-14

## 2024-11-06 ENCOUNTER — TELEPHONE (OUTPATIENT)
Dept: PRIMARY CARE | Facility: CLINIC | Age: 59
End: 2024-11-06
Payer: MEDICARE

## 2024-11-06 DIAGNOSIS — G43.809 VESTIBULAR MIGRAINE: ICD-10-CM

## 2024-11-06 DIAGNOSIS — I10 ESSENTIAL (PRIMARY) HYPERTENSION: ICD-10-CM

## 2024-11-06 RX ORDER — TORSEMIDE 10 MG/1
TABLET ORAL
Qty: 60 TABLET | Refills: 0 | Status: SHIPPED | OUTPATIENT
Start: 2024-11-06

## 2024-11-08 ENCOUNTER — TELEPHONE (OUTPATIENT)
Dept: PRIMARY CARE | Facility: CLINIC | Age: 59
End: 2024-11-08
Payer: MEDICARE

## 2024-11-08 ENCOUNTER — PATIENT MESSAGE (OUTPATIENT)
Dept: PRIMARY CARE | Facility: CLINIC | Age: 59
End: 2024-11-08
Payer: MEDICARE

## 2024-11-08 DIAGNOSIS — I10 ESSENTIAL (PRIMARY) HYPERTENSION: ICD-10-CM

## 2024-11-08 DIAGNOSIS — H81.09 MENIERE'S DISEASE, UNSPECIFIED LATERALITY: ICD-10-CM

## 2024-11-08 DIAGNOSIS — G43.809 VESTIBULAR MIGRAINE: ICD-10-CM

## 2024-11-08 RX ORDER — PSEUDOEPHEDRINE HYDROCHLORIDE 240 MG/1
240 TABLET, FILM COATED, EXTENDED RELEASE ORAL DAILY
Qty: 14 TABLET | Refills: 3 | Status: SHIPPED | OUTPATIENT
Start: 2024-11-08

## 2024-11-12 ENCOUNTER — PATIENT MESSAGE (OUTPATIENT)
Dept: PRIMARY CARE | Facility: CLINIC | Age: 59
End: 2024-11-12
Payer: MEDICARE

## 2024-11-12 DIAGNOSIS — G43.809 VESTIBULAR MIGRAINE: ICD-10-CM

## 2024-11-12 DIAGNOSIS — I10 ESSENTIAL (PRIMARY) HYPERTENSION: ICD-10-CM

## 2024-11-12 DIAGNOSIS — H81.09 MENIERE'S DISEASE, UNSPECIFIED LATERALITY: ICD-10-CM

## 2024-11-13 RX ORDER — PSEUDOEPHEDRINE HYDROCHLORIDE 240 MG/1
240 TABLET, FILM COATED, EXTENDED RELEASE ORAL DAILY
Qty: 30 TABLET | Refills: 11 | Status: SHIPPED | OUTPATIENT
Start: 2024-11-13 | End: 2024-11-15 | Stop reason: SDUPTHER

## 2024-11-15 RX ORDER — PSEUDOEPHEDRINE HYDROCHLORIDE 240 MG/1
240 TABLET, FILM COATED, EXTENDED RELEASE ORAL DAILY
Qty: 30 TABLET | Refills: 11 | Status: SHIPPED | OUTPATIENT
Start: 2024-11-15

## 2024-12-01 DIAGNOSIS — G43.809 VESTIBULAR MIGRAINE: ICD-10-CM

## 2024-12-01 DIAGNOSIS — I10 ESSENTIAL (PRIMARY) HYPERTENSION: ICD-10-CM

## 2024-12-02 RX ORDER — TORSEMIDE 10 MG/1
TABLET ORAL
Qty: 60 TABLET | Refills: 0 | Status: SHIPPED | OUTPATIENT
Start: 2024-12-02

## 2024-12-30 DIAGNOSIS — G43.809 VESTIBULAR MIGRAINE: ICD-10-CM

## 2024-12-30 DIAGNOSIS — I10 ESSENTIAL (PRIMARY) HYPERTENSION: ICD-10-CM

## 2024-12-30 RX ORDER — TORSEMIDE 10 MG/1
TABLET ORAL
Qty: 60 TABLET | Refills: 0 | Status: SHIPPED | OUTPATIENT
Start: 2024-12-30

## 2025-01-16 ENCOUNTER — PATIENT MESSAGE (OUTPATIENT)
Dept: PRIMARY CARE | Facility: CLINIC | Age: 60
End: 2025-01-16
Payer: MEDICARE

## 2025-01-16 DIAGNOSIS — J32.9 SINUSITIS, UNSPECIFIED CHRONICITY, UNSPECIFIED LOCATION: Primary | ICD-10-CM

## 2025-01-16 RX ORDER — CEPHALEXIN 500 MG/1
500 CAPSULE ORAL 2 TIMES DAILY
Qty: 14 CAPSULE | Refills: 0 | Status: SHIPPED | OUTPATIENT
Start: 2025-01-16 | End: 2025-01-23

## 2025-01-23 ENCOUNTER — TELEPHONE (OUTPATIENT)
Dept: CARDIOLOGY | Facility: CLINIC | Age: 60
End: 2025-01-23

## 2025-01-23 ENCOUNTER — APPOINTMENT (OUTPATIENT)
Dept: PRIMARY CARE | Facility: CLINIC | Age: 60
End: 2025-01-23
Payer: MEDICARE

## 2025-01-24 ENCOUNTER — TELEMEDICINE (OUTPATIENT)
Dept: PRIMARY CARE | Facility: CLINIC | Age: 60
End: 2025-01-24
Payer: MEDICARE

## 2025-01-24 DIAGNOSIS — J06.9 UPPER RESPIRATORY TRACT INFECTION, UNSPECIFIED TYPE: Primary | ICD-10-CM

## 2025-01-24 NOTE — PROGRESS NOTES
Virtual or Telephone Consent    A telephone visit (audio only) between the patient (at the originating site) and the provider (at the distant site) was utilized to provide this telehealth service.   Verbal consent was requested and obtained from Rosita Darby on this date, 01/24/25 for a telehealth visit.   Subjective   Patient ID: Rsoita Darby is a 59 y.o. female who presents for respiratory infection   HPI  Patient had COIVD 19 few weeks ago   She also had sinusitis   She was evaluated at an urgent care and they treated her with azithromycin and prednisone .   She was treated with azithromycin and prednisone which seemed to help , with the cough , and some of the symptom however   Chest still feels tight and she would like to get a chest Xray     Review of Systems    Past Medical History:   Diagnosis Date    Allergy to other foods     History of food allergy    Aortic stenosis due to bicuspid aortic valve     Meniere's disease, unspecified ear     Meniere disease    Other allergy status, other than to drugs and biological substances     Environmental allergies    Other conditions influencing health status     History of anesthesia problem    Personal history of other diseases of the circulatory system     H/O: HTN (hypertension)    Personal history of other diseases of the circulatory system     History of mitral valve prolapse    Personal history of other diseases of the musculoskeletal system and connective tissue     History of osteoporosis       Past Surgical History:   Procedure Laterality Date    BI STEREOTACTIC GUIDED BREAST LEFT LOCALIZATION Left 11/24/2020    BI STEREOTACTIC GUIDED BREAST LEFT LOCALIZATION    DILATION AND CURETTAGE OF UTERUS  01/31/2017    Dilation And Curettage    HYSTERECTOMY  01/31/2017    Hysterectomy    LAPAROSCOPY DIAGNOSTIC / BIOPSY / ASPIRATION / LYSIS  01/31/2017    Exploratory Laparoscopy    TONSILLECTOMY  01/31/2017    Tonsillectomy      Social History     Socioeconomic  History    Marital status:    Tobacco Use    Smoking status: Never    Smokeless tobacco: Never   Substance and Sexual Activity    Alcohol use: Not Currently    Drug use: Never      Family History   Problem Relation Name Age of Onset    Rheumatic fever Mother      Other (heart trouble) Brother      Other (heart trouble) Maternal Grandmother      Other (heart trouble) Paternal Grandmother      Other (heart trouble) Paternal Grandfather         MEDICATIONS AND ALLERGIES:    ALLERGIES Egg, Influenza virus vaccine whole, Latex, Lidocaine hcl, Lorazepam, Milk, Sulfa (sulfonamide antibiotics), and Tree nuts    MEDICATIONS   Current Outpatient Medications on File Prior to Visit   Medication Sig Dispense Refill    adrenal cortex, porcine, (ADRENAL ORAL) Take by mouth. organ concentrates (ADRENAL ORAL)      aspirin 81 mg EC tablet Take 1 tablet (81 mg) by mouth once daily.      BERBERINE CHLORIDE ORAL Take by mouth.      BORON ORAL Take by mouth.      [] cephalexin (Keflex) 500 mg capsule Take 1 capsule (500 mg) by mouth 2 times a day for 7 days. 14 capsule 0    cholecalciferol (Vitamin D-3) 5,000 Units tablet Take by mouth 1 (one) time per week. Take 1      coenzyme Q-10 100 mg capsule Take by mouth.      Creon 36,000-114,000- 180,000 unit capsule,delayed release(DR/EC) capsule Take by mouth. take 1 capsule by oral route 2 times every day with meals and 1 capsule with each snack swallowing whole. Do not crush, chew and/or divide.      dimenhyDRINATE (Dramamine) 50 mg tablet Take 1 tablet (50 mg) by mouth every 6 hours if needed.      fluticasone (Flonase) 50 mcg/actuation nasal spray Administer 1 spray into each nostril once daily.      magnesium oxide 500 mg tablet Take 1 tablet (500 mg) by mouth once daily.      NP Thyroid 60 mg tablet Take by mouth 2 times a day. Take 1      OREGANO OIL ORAL Take by mouth.      sertraline (Zoloft) 25 mg tablet       Sudafed 24 Hour 240 mg tablet extended release 24 hr 24 hr  "tablet Take 1 tablet (240 mg) by mouth once daily. SUDAFED IS THE ONLY DRUG PT HAS GO TO [facility]. 30 tablet 11    torsemide (Demadex) 10 mg tablet TAKE ONE TABLET BY MOUTH TWICE A DAY. FORCE FLUIDS AND LIMIT SALT INTAKE AS DIRECTED 60 tablet 0    triamcinolone (Nasacort) 55 mcg nasal inhaler       ZINC ORAL Take by mouth once daily. ZINC (unknown strength); take 1       No current facility-administered medications on file prior to visit.              Objective   Visit Vitals  Smoking Status Never          3/4/2020     9:23 AM 2/10/2022     9:42 AM 3/8/2022     9:32 AM 10/5/2023    10:03 AM 4/18/2024     7:49 AM 10/14/2024     8:17 AM   Vitals   Systolic 129   146 102 168   Diastolic 81   80 70 83   BP Location    Right arm     Heart Rate 103   101 100 101   Temp  36.3 °C (97.4 °F)   37.1 °C (98.7 °F) 35.7 °C (96.2 °F)   Resp 18        Height 1.499 m (4' 11\") 1.499 m (4' 11\")  1.499 m (4' 11\") 1.467 m (4' 9.75\") 1.473 m (4' 10\")   Weight (lb) 138 138 113 108.2 115 116.2   BMI 27.87 kg/m2 27.87 kg/m2 22.82 kg/m2 21.85 kg/m2 24.24 kg/m2 24.29 kg/m2   BSA (m2) 1.61 m2 1.61 m2 1.46 m2 1.43 m2 1.46 m2 1.47 m2   Visit Report    Report Report Report     Physical Exam    Patient visit completed via telephone visit. I spent 7 minutes on the phone with the patient and instructed them to contact me with any questions or new concerns.      Assessment & Plan  Upper respiratory tract infection, unspecified type  Will order the xray   Still have  1 day of azithormycin   Alarming signs dicussed , contact us if she has any   Orders:    XR chest 2 views; Future             "

## 2025-01-28 ENCOUNTER — APPOINTMENT (OUTPATIENT)
Dept: PRIMARY CARE | Facility: CLINIC | Age: 60
End: 2025-01-28
Payer: MEDICARE

## 2025-01-28 DIAGNOSIS — G43.809 VESTIBULAR MIGRAINE: ICD-10-CM

## 2025-01-28 DIAGNOSIS — I10 ESSENTIAL (PRIMARY) HYPERTENSION: ICD-10-CM

## 2025-01-29 RX ORDER — TORSEMIDE 10 MG/1
TABLET ORAL
Qty: 60 TABLET | Refills: 0 | Status: SHIPPED | OUTPATIENT
Start: 2025-01-29

## 2025-02-23 DIAGNOSIS — I10 ESSENTIAL (PRIMARY) HYPERTENSION: ICD-10-CM

## 2025-02-23 DIAGNOSIS — G43.809 VESTIBULAR MIGRAINE: ICD-10-CM

## 2025-02-24 RX ORDER — TORSEMIDE 10 MG/1
TABLET ORAL
Qty: 60 TABLET | Refills: 0 | Status: SHIPPED | OUTPATIENT
Start: 2025-02-24 | End: 2025-02-25 | Stop reason: SDUPTHER

## 2025-02-25 ENCOUNTER — PATIENT MESSAGE (OUTPATIENT)
Dept: PRIMARY CARE | Facility: CLINIC | Age: 60
End: 2025-02-25
Payer: MEDICARE

## 2025-02-25 DIAGNOSIS — G43.809 VESTIBULAR MIGRAINE: ICD-10-CM

## 2025-02-25 DIAGNOSIS — I10 ESSENTIAL (PRIMARY) HYPERTENSION: ICD-10-CM

## 2025-02-25 RX ORDER — TORSEMIDE 10 MG/1
TABLET ORAL
Qty: 60 TABLET | Refills: 0 | Status: SHIPPED | OUTPATIENT
Start: 2025-02-25

## 2025-04-09 ENCOUNTER — PATIENT MESSAGE (OUTPATIENT)
Dept: PRIMARY CARE | Facility: CLINIC | Age: 60
End: 2025-04-09
Payer: MEDICARE

## 2025-04-09 DIAGNOSIS — G43.809 VESTIBULAR MIGRAINE: ICD-10-CM

## 2025-04-09 DIAGNOSIS — I10 ESSENTIAL (PRIMARY) HYPERTENSION: ICD-10-CM

## 2025-04-10 RX ORDER — TORSEMIDE 10 MG/1
TABLET ORAL
Qty: 60 TABLET | Refills: 11 | Status: SHIPPED | OUTPATIENT
Start: 2025-04-10

## 2025-04-21 ENCOUNTER — APPOINTMENT (OUTPATIENT)
Dept: PRIMARY CARE | Facility: CLINIC | Age: 60
End: 2025-04-21
Payer: MEDICARE

## 2025-04-21 VITALS
OXYGEN SATURATION: 97 % | TEMPERATURE: 96.5 F | BODY MASS INDEX: 26.45 KG/M2 | SYSTOLIC BLOOD PRESSURE: 150 MMHG | HEART RATE: 81 BPM | HEIGHT: 58 IN | WEIGHT: 126 LBS | DIASTOLIC BLOOD PRESSURE: 82 MMHG

## 2025-04-21 DIAGNOSIS — Z12.12 SCREENING FOR COLORECTAL CANCER: ICD-10-CM

## 2025-04-21 DIAGNOSIS — Z13.1 SCREENING FOR DIABETES MELLITUS: ICD-10-CM

## 2025-04-21 DIAGNOSIS — R79.9 ABNORMAL FINDING OF BLOOD CHEMISTRY, UNSPECIFIED: ICD-10-CM

## 2025-04-21 DIAGNOSIS — R29.890 LOSS OF HEIGHT: ICD-10-CM

## 2025-04-21 DIAGNOSIS — I35.0 NONRHEUMATIC AORTIC (VALVE) STENOSIS: ICD-10-CM

## 2025-04-21 DIAGNOSIS — Z00.01 ENCOUNTER FOR GENERAL ADULT MEDICAL EXAMINATION WITH ABNORMAL FINDINGS: Primary | ICD-10-CM

## 2025-04-21 DIAGNOSIS — G43.809 VESTIBULAR MIGRAINE: ICD-10-CM

## 2025-04-21 DIAGNOSIS — I10 ESSENTIAL (PRIMARY) HYPERTENSION: ICD-10-CM

## 2025-04-21 DIAGNOSIS — Z12.31 ENCOUNTER FOR SCREENING MAMMOGRAM FOR MALIGNANT NEOPLASM OF BREAST: ICD-10-CM

## 2025-04-21 DIAGNOSIS — E16.1 HYPERINSULINEMIA: ICD-10-CM

## 2025-04-21 DIAGNOSIS — H53.143 PHOTOPHOBIA OF BOTH EYES: ICD-10-CM

## 2025-04-21 DIAGNOSIS — Z78.0 OSTEOPENIA AFTER MENOPAUSE: ICD-10-CM

## 2025-04-21 DIAGNOSIS — K90.3 PANCREATIC STEATORRHEA (HHS-HCC): ICD-10-CM

## 2025-04-21 DIAGNOSIS — Z12.11 SCREENING FOR COLORECTAL CANCER: ICD-10-CM

## 2025-04-21 DIAGNOSIS — E03.9 HYPOTHYROIDISM, UNSPECIFIED TYPE: ICD-10-CM

## 2025-04-21 DIAGNOSIS — Z78.9 FULL CODE STATUS: ICD-10-CM

## 2025-04-21 DIAGNOSIS — M85.80 OSTEOPENIA AFTER MENOPAUSE: ICD-10-CM

## 2025-04-21 DIAGNOSIS — H90.3 SENSORINEURAL HEARING LOSS (SNHL) OF BOTH EARS: ICD-10-CM

## 2025-04-21 DIAGNOSIS — Z53.20: ICD-10-CM

## 2025-04-21 PROCEDURE — 3008F BODY MASS INDEX DOCD: CPT | Performed by: FAMILY MEDICINE

## 2025-04-21 PROCEDURE — 1036F TOBACCO NON-USER: CPT | Performed by: FAMILY MEDICINE

## 2025-04-21 PROCEDURE — 3079F DIAST BP 80-89 MM HG: CPT | Performed by: FAMILY MEDICINE

## 2025-04-21 PROCEDURE — 3077F SYST BP >= 140 MM HG: CPT | Performed by: FAMILY MEDICINE

## 2025-04-21 PROCEDURE — G0439 PPPS, SUBSEQ VISIT: HCPCS | Performed by: FAMILY MEDICINE

## 2025-04-21 PROCEDURE — 99214 OFFICE O/P EST MOD 30 MIN: CPT | Performed by: FAMILY MEDICINE

## 2025-04-21 RX ORDER — TORSEMIDE 10 MG/1
TABLET ORAL
Qty: 60 TABLET | Refills: 11 | Status: SHIPPED | OUTPATIENT
Start: 2025-04-21

## 2025-04-21 ASSESSMENT — ACTIVITIES OF DAILY LIVING (ADL)
BATHING: INDEPENDENT
DOING_HOUSEWORK: INDEPENDENT
MANAGING_FINANCES: INDEPENDENT
DRESSING: INDEPENDENT
TAKING_MEDICATION: INDEPENDENT
GROCERY_SHOPPING: INDEPENDENT

## 2025-04-21 ASSESSMENT — PATIENT HEALTH QUESTIONNAIRE - PHQ9
2. FEELING DOWN, DEPRESSED OR HOPELESS: NOT AT ALL
SUM OF ALL RESPONSES TO PHQ9 QUESTIONS 1 AND 2: 0
1. LITTLE INTEREST OR PLEASURE IN DOING THINGS: NOT AT ALL

## 2025-04-21 NOTE — ASSESSMENT & PLAN NOTE
Orders:    [facility] MISCELLANEOUS TEST (FROZEN); Future    torsemide (Demadex) 10 mg tablet; TAKE ONE TABLET BY MOUTH TWICE A DAY. FORCE FLUIDS AND LIMIT SALT INTAKE AS DIRECTED    C-reactive protein; Future

## 2025-04-21 NOTE — ASSESSMENT & PLAN NOTE
Orders:    torsemide (Demadex) 10 mg tablet; TAKE ONE TABLET BY MOUTH TWICE A DAY. FORCE FLUIDS AND LIMIT SALT INTAKE AS DIRECTED

## 2025-04-21 NOTE — PROGRESS NOTES
"Subjective   Patient ID: Rosita Darby is a 60 y.o. female who presents for Medicare Annual Wellness Visit Subsequent.    HPI   APPT: 0800  AWV: 0815  18998: 09:19  :  0835    NEEDS:  REFILLS ON TORSEMIDE.  - THESE WERE SENT LAST WEEK.        [facility] DISCUSSION:      CARDIO DR ZAMORANO   ENDO DR HUSAIN.      \"I NEED AN ORDER FOR SHIELD THE BLOOD BASED SCREEN FOR COLON CANCER.\" [facility] BLOOD TESTING. : \"THE SHIELD TEST.\"     Review of Systems   All other systems reviewed and are negative.    I GET TIRES POST PRANDIAL   NEEDS WATER IN MID NIGHT AND BLURRED VISION UPON WAKING.    C/O WT GAIN.  ASKS WHY?     Objective   /82   Pulse 81   Temp 35.8 °C (96.5 °F)   Ht 1.473 m (4' 10\")   Wt 57.2 kg (126 lb)   SpO2 97%   BMI 26.33 kg/m²     BP ELEVATED TODAY AND DID TAKE HER MEDS.    HAD AND DARK EYEGLASSES IN PLACE,.            Physical Exam  Vitals and nursing note reviewed.   Constitutional:       Appearance: Normal appearance.   HENT:      Head: Normocephalic.      Right Ear: Tympanic membrane, ear canal and external ear normal.      Left Ear: Tympanic membrane, ear canal and external ear normal.      Nose: Nose normal.      Mouth/Throat:      Mouth: Mucous membranes are moist.      Pharynx: Oropharynx is clear.   Eyes:      Conjunctiva/sclera: Conjunctivae normal.      Pupils: Pupils are equal, round, and reactive to light.   Cardiovascular:      Rate and Rhythm: Normal rate and regular rhythm.      Pulses: Normal pulses.      Heart sounds: Normal heart sounds.   Pulmonary:      Effort: Pulmonary effort is normal.      Breath sounds: Normal breath sounds.   Abdominal:      General: Bowel sounds are normal.      Palpations: Abdomen is soft.   Musculoskeletal:         General: Normal range of motion.      Cervical back: Normal range of motion and neck supple.   Skin:     General: Skin is warm and dry.   Neurological:      General: No focal deficit present.      Mental Status: Mental status is " "at baseline.   Psychiatric:         Mood and Affect: Mood normal.         Behavior: Behavior normal.         Thought Content: Thought content normal.         Judgment: Judgment normal.         Assessment/Plan   Assessment & Plan  Encounter for general adult medical examination with abnormal findings    Orders:    QUEST INSULIN; Future    QUEST MISCELLANEOUS TEST (FROZEN); Future    Lipid panel; Future    Hepatitis C antibody; Future    BI mammo bilateral screening tomosynthesis; Future    Hemoglobin A1c; Future    XR DEXA bone density; Future    torsemide (Demadex) 10 mg tablet; TAKE ONE TABLET BY MOUTH TWICE A DAY. FORCE FLUIDS AND LIMIT SALT INTAKE AS DIRECTED    Vitamin D 25-Hydroxy,Total (for eval of Vitamin D levels); Future    Urinalysis with Reflex Microscopic; Future    TSH with reflex to Free T4 if abnormal; Future    Comprehensive Metabolic Panel; Future    CBC and Auto Differential; Future    Insulin, random; Future    C-reactive protein; Future    Follow Up In Primary Care - Medicare Annual; Future    Full code status    Orders:    Full code    Screening for colorectal cancer    Orders:    General supply request [facility] BLOOD TESTING.  : \"THE SHIELD TEST.\"    QUEST MISCELLANEOUS TEST (FROZEN); Future    Loss of height    Orders:    XR DEXA bone density; Future    Screening for diabetes mellitus    Orders:    QUEST INSULIN; Future    Hemoglobin A1c; Future    C-Peptide; Future    Encounter for screening mammogram for malignant neoplasm of breast    Orders:    BI mammo bilateral screening tomosynthesis; Future    Essential (primary) hypertension    Orders:    QUEST MISCELLANEOUS TEST (FROZEN); Future    torsemide (Demadex) 10 mg tablet; TAKE ONE TABLET BY MOUTH TWICE A DAY. FORCE FLUIDS AND LIMIT SALT INTAKE AS DIRECTED    C-reactive protein; Future    Vestibular migraine    Orders:    torsemide (Demadex) 10 mg tablet; TAKE ONE TABLET BY MOUTH TWICE A DAY. FORCE FLUIDS AND LIMIT SALT INTAKE AS " DIRECTED    Nonrheumatic aortic (valve) stenosis    Orders:    Insulin, random; Future    Sensorineural hearing loss (SNHL) of both ears    Orders:    Insulin, random; Future    Hypothyroidism, unspecified type    Orders:    QUEST INSULIN; Future    QUEST MISCELLANEOUS TEST (FROZEN); Future    Insulin, random; Future    Hyperinsulinemia    Orders:    QUEST MISCELLANEOUS TEST (FROZEN); Future    C-Peptide; Future    Photophobia of both eyes         Pancreatic steatorrhea ([facility])    Orders:    Vitamin D 25-Hydroxy,Total (for eval of Vitamin D levels); Future    Abnormal finding of blood chemistry, unspecified    Orders:    Hemoglobin A1c; Future    Diabetes mellitus screening declined by patient    Orders:    CBC and Auto Differential; Future    Osteopenia after menopause    Orders:    XR DEXA bone density; Future

## 2025-04-21 NOTE — PATIENT INSTRUCTIONS
"USE pic5.  CALL FOR NEEDS [phone].   KEEP ON MEDICATIONS  KEEP SPECIALTY APPOINTMENTS.      PLEASE CONTACT ENDOCRINOLOGIST DR HUSAIN FOR INSULIN TESTING IF MY ORDER FAILS.    GO ONLINE TO ORDER [facility] CANC[facility] SCREENING TEST: \"SHIELD.\"  I TRIED TO ORDER IT BUT MAY NOT BE CORRECT.    ? GP1 OR OTHER TESTS I WILL ORDER FOR YOU BUT I NEED MORE INFO:  PLEASE CALL BACK WITH MORE INSTRUCTION FOR DR ADAMS AND I WILL ORDER SUCH.   SIMILIARLY:  BORON LEVEL \"\" OR [facility] ORDER 40404.7 IS NOT WORKING IN [facility] FOR DR ADAMS.      To order a serum boron blood test, you'll need to use a specific test code. The code varies depending on the laboratory, but common ones include 3356 ([facility]), 9163938 ([facility]), and 0711SP ([facility]). You'll also need the CPT code 82956. When ordering, provide the test code, and optionally, the CPT code.     GET MAMMO  GET FASTED LABS   GET BONE DENSITY       "

## 2025-04-24 LAB
25(OH)D3+25(OH)D2 SERPL-MCNC: 58 NG/ML (ref 30–100)
ALBUMIN SERPL-MCNC: 4.4 G/DL (ref 3.6–5.1)
ALP SERPL-CCNC: 115 U/L (ref 37–153)
ALT SERPL-CCNC: 16 U/L (ref 6–29)
ANION GAP SERPL CALCULATED.4IONS-SCNC: 11 MMOL/L (CALC) (ref 7–17)
AST SERPL-CCNC: 14 U/L (ref 10–35)
BASOPHILS # BLD AUTO: 38 CELLS/UL (ref 0–200)
BASOPHILS NFR BLD AUTO: 0.5 %
BILIRUB SERPL-MCNC: 0.4 MG/DL (ref 0.2–1.2)
BUN SERPL-MCNC: 21 MG/DL (ref 7–25)
C PEPTIDE SERPL-MCNC: 3.68 NG/ML (ref 0.8–3.85)
CALCIUM SERPL-MCNC: 10 MG/DL (ref 8.6–10.4)
CHLORIDE SERPL-SCNC: 100 MMOL/L (ref 98–110)
CHOLEST SERPL-MCNC: 258 MG/DL
CHOLEST/HDLC SERPL: 3 (CALC)
CO2 SERPL-SCNC: 30 MMOL/L (ref 20–32)
CREAT SERPL-MCNC: 0.59 MG/DL (ref 0.5–1.05)
CRP SERPL-MCNC: <3 MG/L
EGFRCR SERPLBLD CKD-EPI 2021: 102 ML/MIN/1.73M2
EOSINOPHIL # BLD AUTO: 327 CELLS/UL (ref 15–500)
EOSINOPHIL NFR BLD AUTO: 4.3 %
ERYTHROCYTE [DISTWIDTH] IN BLOOD BY AUTOMATED COUNT: 12.9 % (ref 11–15)
EST. AVERAGE GLUCOSE BLD GHB EST-MCNC: 123 MG/DL
EST. AVERAGE GLUCOSE BLD GHB EST-SCNC: 6.8 MMOL/L
GLUCOSE SERPL-MCNC: 104 MG/DL (ref 65–99)
HBA1C MFR BLD: 5.9 %
HCT VFR BLD AUTO: 45.5 % (ref 35–45)
HCV AB SERPL QL IA: NORMAL
HDLC SERPL-MCNC: 86 MG/DL
HGB BLD-MCNC: 14.8 G/DL (ref 11.7–15.5)
INSULIN SERPL-ACNC: 15.7 UIU/ML
LDLC SERPL CALC-MCNC: 154 MG/DL (CALC)
LYMPHOCYTES # BLD AUTO: 2265 CELLS/UL (ref 850–3900)
LYMPHOCYTES NFR BLD AUTO: 29.8 %
MCH RBC QN AUTO: 26.1 PG (ref 27–33)
MCHC RBC AUTO-ENTMCNC: 32.5 G/DL (ref 32–36)
MCV RBC AUTO: 80.2 FL (ref 80–100)
MONOCYTES # BLD AUTO: 859 CELLS/UL (ref 200–950)
MONOCYTES NFR BLD AUTO: 11.3 %
NEUTROPHILS # BLD AUTO: 4112 CELLS/UL (ref 1500–7800)
NEUTROPHILS NFR BLD AUTO: 54.1 %
NONHDLC SERPL-MCNC: 172 MG/DL (CALC)
PLATELET # BLD AUTO: 367 THOUSAND/UL (ref 140–400)
PMV BLD REES-ECKER: 11.9 FL (ref 7.5–12.5)
POTASSIUM SERPL-SCNC: 4.5 MMOL/L (ref 3.5–5.3)
PROT SERPL-MCNC: 7.1 G/DL (ref 6.1–8.1)
RBC # BLD AUTO: 5.67 MILLION/UL (ref 3.8–5.1)
SODIUM SERPL-SCNC: 141 MMOL/L (ref 135–146)
TRIGL SERPL-MCNC: 76 MG/DL
WBC # BLD AUTO: 7.6 THOUSAND/UL (ref 3.8–10.8)

## 2025-04-24 NOTE — RESULT ENCOUNTER NOTE
ELEVATED LIPIDS AND  BUT VERY GOOD HDL 86.  NO HCV.    AIC 5.9%  PRE-DM2 RANGE.  C-PEPTIDE 3.68 [0.80-3.85] HIGH END OF NL MEANING YOUR BODY MAKES NEAR MAX LEVEL OF INSULIN.  ACTUAL INSULIN LEVEL 15.7 [=< 18.4] C/W SOME INSULIN RESISTANCE.    NO TSH LEVEL.  PT REFUSED TESTING.  ENDO CARE PER DR HUSAIN.

## 2025-05-01 LAB — BORON [MASS/VOLUME] IN BLOOD: NORMAL MCG/L

## 2025-05-05 ENCOUNTER — TELEPHONE (OUTPATIENT)
Dept: PRIMARY CARE | Facility: CLINIC | Age: 60
End: 2025-05-05
Payer: MEDICARE

## 2025-05-05 NOTE — TELEPHONE ENCOUNTER
----- Message from Gordo Louis sent at 5/2/2025  6:09 PM EDT -----  TELL PT NO BORON DETECTED.    ----- Message -----  From: Lee, Dibbz Results In  Sent: 4/23/2025  11:17 PM EDT  To: Gordo Louis MD

## 2025-06-02 ENCOUNTER — PATIENT MESSAGE (OUTPATIENT)
Dept: PRIMARY CARE | Facility: CLINIC | Age: 60
End: 2025-06-02
Payer: MEDICARE

## 2025-06-02 ENCOUNTER — TELEPHONE (OUTPATIENT)
Dept: CARDIOLOGY | Facility: CLINIC | Age: 60
End: 2025-06-02
Payer: MEDICARE

## 2025-06-02 DIAGNOSIS — M81.0 OSTEOPOROSIS WITHOUT CURRENT PATHOLOGICAL FRACTURE, UNSPECIFIED OSTEOPOROSIS TYPE: Primary | ICD-10-CM

## 2025-06-11 ENCOUNTER — PATIENT MESSAGE (OUTPATIENT)
Dept: PRIMARY CARE | Facility: CLINIC | Age: 60
End: 2025-06-11
Payer: MEDICARE

## 2025-06-12 DIAGNOSIS — I35.0 AORTIC VALVE STENOSIS, ETIOLOGY OF CARDIAC VALVE DISEASE UNSPECIFIED: ICD-10-CM

## 2025-06-12 RX ORDER — SEMAGLUTIDE 0.25 MG/.5ML
0.25 INJECTION, SOLUTION SUBCUTANEOUS WEEKLY
Qty: 2 ML | Refills: 0 | Status: SHIPPED | OUTPATIENT
Start: 2025-06-12 | End: 2025-07-04

## 2025-06-12 NOTE — PROGRESS NOTES
Subjective   Patient ID: Rosita Darby is a 60 y.o. female who presents for No chief complaint on file..    HPI   1--TRIAGE FOR DR SONIA RICH RHEUMATOLOGY.  2--RECENT DR ZAMORANO REFUSED RED YEAST RICE FOR PT TO TAKE: IF STATIN THERAPY WOULD BE OK?   3--WT LOSS MED:    PHENTERMINE : FOR HEART AORTIC DZ & STENOSIS PHENTERMINE NOT RECOMMENDED.   WEGOVY I WILL TRY TO ORDER YET MEDICAL INDICATIONS ARE:  RAMY, OBESITY, DM2, MAYBE HEART / RENAL DISEASE.          Review of Systems    Objective   There were no vitals taken for this visit.    Physical Exam    Assessment/Plan

## 2025-06-18 ENCOUNTER — TELEPHONE (OUTPATIENT)
Dept: PRIMARY CARE | Facility: CLINIC | Age: 60
End: 2025-06-18
Payer: MEDICARE

## 2025-06-18 NOTE — TELEPHONE ENCOUNTER
[facility] PHARMACIST CALLING ON BEHALF OF [employer] WORKING ON PA REQUEST FOR MARIA G'S WEGOVY  HE DOES NEED TO KNOW HER DX AND A COUPLE OTHER PIECES OF INFORMATION  ASKING FOR PHONE CALL   PHONE: [phone]

## 2025-07-15 ENCOUNTER — PATIENT MESSAGE (OUTPATIENT)
Dept: PRIMARY CARE | Facility: CLINIC | Age: 60
End: 2025-07-15
Payer: MEDICARE

## 2025-07-15 DIAGNOSIS — E66.09 OBESITY DUE TO EXCESS CALORIES WITH SERIOUS COMORBIDITY, UNSPECIFIED CLASS: Primary | ICD-10-CM

## 2025-07-15 RX ORDER — SEMAGLUTIDE 0.5 MG/.5ML
0.5 INJECTION, SOLUTION SUBCUTANEOUS WEEKLY
Qty: 2 ML | Refills: 0 | Status: SHIPPED | OUTPATIENT
Start: 2025-07-15 | End: 2025-08-06

## 2025-07-16 DIAGNOSIS — M81.0 OSTEOPOROSIS WITHOUT CURRENT PATHOLOGICAL FRACTURE, UNSPECIFIED OSTEOPOROSIS TYPE: Primary | ICD-10-CM

## 2025-08-19 DIAGNOSIS — E66.09 OBESITY DUE TO EXCESS CALORIES WITH SERIOUS COMORBIDITY, UNSPECIFIED CLASS: ICD-10-CM

## 2025-08-20 ENCOUNTER — PATIENT MESSAGE (OUTPATIENT)
Dept: PRIMARY CARE | Facility: CLINIC | Age: 60
End: 2025-08-20
Payer: MEDICARE

## 2025-08-20 DIAGNOSIS — K90.3 PANCREATIC STEATORRHEA (HHS-HCC): ICD-10-CM

## 2025-08-20 DIAGNOSIS — K21.9 GASTROESOPHAGEAL REFLUX DISEASE WITHOUT ESOPHAGITIS: Primary | ICD-10-CM

## 2025-08-20 RX ORDER — SEMAGLUTIDE 0.5 MG/.5ML
INJECTION, SOLUTION SUBCUTANEOUS
Qty: 2 ML | Refills: 0 | Status: SHIPPED | OUTPATIENT
Start: 2025-08-20

## 2025-08-21 RX ORDER — PANTOPRAZOLE SODIUM 20 MG/1
20 TABLET, DELAYED RELEASE ORAL
Qty: 30 TABLET | Refills: 11 | Status: SHIPPED | OUTPATIENT
Start: 2025-08-21 | End: 2026-08-21